# Patient Record
Sex: FEMALE | Race: BLACK OR AFRICAN AMERICAN | NOT HISPANIC OR LATINO | URBAN - NONMETROPOLITAN AREA
[De-identification: names, ages, dates, MRNs, and addresses within clinical notes are randomized per-mention and may not be internally consistent; named-entity substitution may affect disease eponyms.]

---

## 2018-11-29 NOTE — PATIENT DISCUSSION
EYELID LESION, RLL:   RECOMMEND BIOPSY WITH SPECIMEN TO PATHOLOGY. RISKS/BENEFITS/ALTERNATIVES DISCUSSED WITH PATIENT.

## 2019-01-30 NOTE — PATIENT DISCUSSION
EYELID CYST RLL:  CYST FLUID FILLED WITH A CAPSULE IN THE CENTER. PATIENT WISHES TO HAVE REMOVED TODAY IN OFFICE.

## 2020-11-16 ENCOUNTER — IMPORTED ENCOUNTER (OUTPATIENT)
Dept: URBAN - NONMETROPOLITAN AREA CLINIC 1 | Facility: CLINIC | Age: 55
End: 2020-11-16

## 2020-11-16 PROBLEM — H25.813: Noted: 2020-11-16

## 2020-11-16 PROCEDURE — 99204 OFFICE O/P NEW MOD 45 MIN: CPT

## 2020-11-16 NOTE — PATIENT DISCUSSION
Cataract(s)-Visually significant cataract OU .-Cataract(s) causing symptomatic impairment of visual function not correctable with a tolerable change in glasses or contact lenses lighting or non-operative means resulting in specific activity limitations and/or participation restrictions including but not limited to reading viewing television driving or meeting vocational or recreational needs. -Expectation is clearer vision and functional improvement in symptoms as well as reduced glare disability after cataract removal.-Order IOLMaster and OPD today. -Recommend   Limbal Relaxing Incisions based on today's OPD testing and lifestyle questionnaire.-All questions were answered regarding surgery including pre and post-op medications appointments activity restrictions and anesthetic usage.-The risks benefits and alternatives and special risk factors for the patient were discussed in detail including but not limited to: bleeding infection retinal detachment vitreous loss problems with the implant and possible need for additional surgery.-Although rare the possibility of complete vision loss was discussed.-The possible need for glasses post-operatively was discussed.-Order medical clearance exam based on history of age -Patient elects to proceed with cataract surgery OD . Will schedule at patient's convenience and re-evaluate OS  in the future. Discussed all lens optionDiscussed astigmatism and she qualifies for LRI and will give her less dependence on gls s/p for distance but informed patient she will need glasses for near vision. Discussed LenSx vs Trad.

## 2020-11-19 ENCOUNTER — IMPORTED ENCOUNTER (OUTPATIENT)
Dept: URBAN - NONMETROPOLITAN AREA CLINIC 1 | Facility: CLINIC | Age: 55
End: 2020-11-19

## 2020-11-19 PROBLEM — H25.813: Noted: 2020-11-19

## 2020-12-22 ENCOUNTER — IMPORTED ENCOUNTER (OUTPATIENT)
Dept: URBAN - NONMETROPOLITAN AREA CLINIC 1 | Facility: CLINIC | Age: 55
End: 2020-12-22

## 2020-12-22 PROBLEM — Z01.818: Noted: 2020-12-22

## 2020-12-22 PROBLEM — E78.5: Noted: 2020-12-22

## 2020-12-22 PROBLEM — E11.9: Noted: 2020-12-22

## 2020-12-22 PROBLEM — I10: Noted: 2020-12-22

## 2021-01-08 ENCOUNTER — IMPORTED ENCOUNTER (OUTPATIENT)
Dept: URBAN - NONMETROPOLITAN AREA CLINIC 1 | Facility: CLINIC | Age: 56
End: 2021-01-08

## 2021-01-08 PROBLEM — E11.9: Noted: 2021-01-08

## 2021-01-08 PROBLEM — Z98.41: Noted: 2021-01-08

## 2021-01-08 PROBLEM — H25.812: Noted: 2021-01-08

## 2021-01-08 PROCEDURE — 99024 POSTOP FOLLOW-UP VISIT: CPT

## 2021-01-08 NOTE — PATIENT DISCUSSION
1 day s/p PCIOL OD stand/trad (01/07/21)-Pt doing well s/p PCIOL. -Continue post-op gtts according to instruction sheet and sleep with eye shield over eye for 7 nights.-Avoid bending at the waist lifting anything over 5lbs and dirty or cynthia environments.-No floater seen today with fundus exam retina is flat and attached 360*.-Patient symptoms of hair in vision OD is most likely from cataract surgery yesterday and is expected to resolve during post-op period.-Pt advised on risks and symptoms of retinal detachment and was advised to call or visit clinic immediately if symptoms occur. Cataract OS- Recommend proceed with cataract surgery as previously scheduled and discussed with Dr. Misael Zuleta.

## 2021-01-12 ENCOUNTER — IMPORTED ENCOUNTER (OUTPATIENT)
Dept: URBAN - NONMETROPOLITAN AREA CLINIC 1 | Facility: CLINIC | Age: 56
End: 2021-01-12

## 2021-01-12 PROCEDURE — 99024 POSTOP FOLLOW-UP VISIT: CPT

## 2021-01-12 NOTE — PATIENT DISCUSSION
1 day s/p PCIOL OD stand/trad (01/07/21)-Pt doing well s/p PCIOL. -Continue post-op gtts according to instruction sheet and sleep with eye shield over eye for 7 nights.-Avoid bending at the waist lifting anything over 5lbs and dirty or cynthia environments.-Patient symptoms of hair in vision OD is most likely from cataract surgery and is resolving.-Pt advised on risks and symptoms of retinal detachment and was advised to call or visit clinic immediately if symptoms occur. Cataract OS- Recommend proceed with cataract surgery as previously scheduled and discussed with Dr. Luz Maria Jama.

## 2022-04-15 ASSESSMENT — VISUAL ACUITY
OD_CC: 20/20
OS_AM: 20/20
OD_GLARE: 20/70
OS_SC: 20/20
OD_SC: 20/50
OD_SC: 20/50
OD_GLARE: 20/70
OD_PAM: 20/25
OD_PAM: 20/25
OS_AM: 20/20
OS_SC: 20/20
OS_GLARE: 20/25
OS_GLARE: 20/25
OS_CC: 20/20
OS_CC: 20/20
OS_AM: 20/20
OD_CC: 20/25-
OS_GLARE: 20/25

## 2022-04-15 ASSESSMENT — TONOMETRY
OS_IOP_MMHG: 14
OS_IOP_MMHG: 15
OD_IOP_MMHG: 16
OS_IOP_MMHG: 18
OD_IOP_MMHG: 15
OD_IOP_MMHG: 14